# Patient Record
Sex: MALE | Race: WHITE | Employment: OTHER | URBAN - METROPOLITAN AREA
[De-identification: names, ages, dates, MRNs, and addresses within clinical notes are randomized per-mention and may not be internally consistent; named-entity substitution may affect disease eponyms.]

---

## 2017-05-18 ENCOUNTER — OFFICE VISIT (OUTPATIENT)
Dept: PODIATRY | Age: 82
End: 2017-05-18
Payer: MEDICARE

## 2017-05-18 VITALS
SYSTOLIC BLOOD PRESSURE: 107 MMHG | WEIGHT: 145 LBS | HEIGHT: 66 IN | DIASTOLIC BLOOD PRESSURE: 51 MMHG | BODY MASS INDEX: 23.3 KG/M2 | HEART RATE: 65 BPM

## 2017-05-18 DIAGNOSIS — B35.1 ONYCHOMYCOSIS OF TOENAIL: Primary | ICD-10-CM

## 2017-05-18 DIAGNOSIS — M79.674 PAIN OF TOES OF BOTH FEET: ICD-10-CM

## 2017-05-18 DIAGNOSIS — M79.675 PAIN OF TOES OF BOTH FEET: ICD-10-CM

## 2017-05-18 DIAGNOSIS — I73.9 PERIPHERAL VASCULAR DISEASE (HCC): ICD-10-CM

## 2017-05-18 PROCEDURE — G8427 DOCREV CUR MEDS BY ELIG CLIN: HCPCS | Performed by: PODIATRIST

## 2017-05-18 PROCEDURE — G8420 CALC BMI NORM PARAMETERS: HCPCS | Performed by: PODIATRIST

## 2017-05-18 PROCEDURE — 4040F PNEUMOC VAC/ADMIN/RCVD: CPT | Performed by: PODIATRIST

## 2017-05-18 PROCEDURE — 1036F TOBACCO NON-USER: CPT | Performed by: PODIATRIST

## 2017-05-18 PROCEDURE — 11721 DEBRIDE NAIL 6 OR MORE: CPT | Performed by: PODIATRIST

## 2017-05-18 PROCEDURE — 1123F ACP DISCUSS/DSCN MKR DOCD: CPT | Performed by: PODIATRIST

## 2017-05-18 RX ORDER — MIRTAZAPINE 30 MG/1
30 TABLET, FILM COATED ORAL
COMMUNITY
Start: 2017-05-15 | End: 2018-02-01

## 2017-05-18 RX ORDER — ATORVASTATIN CALCIUM 20 MG/1
TABLET, FILM COATED ORAL
COMMUNITY
Start: 2017-03-29

## 2017-05-18 RX ORDER — CLOPIDOGREL BISULFATE 75 MG/1
1 TABLET ORAL
COMMUNITY
Start: 2011-10-11

## 2017-05-18 ASSESSMENT — ENCOUNTER SYMPTOMS
BACK PAIN: 0
NAUSEA: 0
COLOR CHANGE: 0
SHORTNESS OF BREATH: 0
DIARRHEA: 0

## 2017-07-27 ENCOUNTER — PROCEDURE VISIT (OUTPATIENT)
Dept: PODIATRY | Age: 82
End: 2017-07-27
Payer: MEDICARE

## 2017-07-27 VITALS
SYSTOLIC BLOOD PRESSURE: 149 MMHG | BODY MASS INDEX: 23.3 KG/M2 | WEIGHT: 145 LBS | HEIGHT: 66 IN | DIASTOLIC BLOOD PRESSURE: 73 MMHG | HEART RATE: 66 BPM

## 2017-07-27 DIAGNOSIS — M79.674 PAIN OF TOES OF BOTH FEET: ICD-10-CM

## 2017-07-27 DIAGNOSIS — B35.1 ONYCHOMYCOSIS OF TOENAIL: Primary | ICD-10-CM

## 2017-07-27 DIAGNOSIS — M79.675 PAIN OF TOES OF BOTH FEET: ICD-10-CM

## 2017-07-27 DIAGNOSIS — I73.9 PERIPHERAL VASCULAR DISEASE (HCC): ICD-10-CM

## 2017-07-27 PROCEDURE — 11721 DEBRIDE NAIL 6 OR MORE: CPT | Performed by: PODIATRIST

## 2017-07-27 PROCEDURE — 1036F TOBACCO NON-USER: CPT | Performed by: PODIATRIST

## 2017-07-27 RX ORDER — ALBUTEROL SULFATE 90 UG/1
1-2 AEROSOL, METERED RESPIRATORY (INHALATION)
COMMUNITY
Start: 2017-05-19

## 2017-08-01 ASSESSMENT — ENCOUNTER SYMPTOMS
SHORTNESS OF BREATH: 0
DIARRHEA: 0
BACK PAIN: 0
COLOR CHANGE: 0
NAUSEA: 0

## 2017-08-18 ENCOUNTER — APPOINTMENT (OUTPATIENT)
Dept: CT IMAGING | Age: 82
End: 2017-08-18
Payer: MEDICARE

## 2017-08-18 ENCOUNTER — HOSPITAL ENCOUNTER (EMERGENCY)
Age: 82
Discharge: HOME | End: 2017-08-18
Attending: EMERGENCY MEDICINE
Payer: MEDICARE

## 2017-08-18 ENCOUNTER — APPOINTMENT (OUTPATIENT)
Dept: GENERAL RADIOLOGY | Age: 82
End: 2017-08-18
Payer: MEDICARE

## 2017-08-18 VITALS
SYSTOLIC BLOOD PRESSURE: 107 MMHG | OXYGEN SATURATION: 98 % | DIASTOLIC BLOOD PRESSURE: 87 MMHG | RESPIRATION RATE: 14 BRPM | WEIGHT: 144 LBS | BODY MASS INDEX: 23.6 KG/M2 | TEMPERATURE: 98.7 F | HEART RATE: 84 BPM

## 2017-08-18 DIAGNOSIS — S40.022A CONTUSION, SHOULDER AND UPPER ARM, MULTIPLE SITES, LEFT, INITIAL ENCOUNTER: ICD-10-CM
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
## 2017-08-18 DIAGNOSIS — S09.90XA CLOSED HEAD INJURY, INITIAL ENCOUNTER: ICD-10-CM

## 2017-08-18 DIAGNOSIS — S20.212A CONTUSION OF CHEST WALL, LEFT, INITIAL ENCOUNTER: ICD-10-CM

## 2017-08-18 DIAGNOSIS — J98.11 ATELECTASIS: ICD-10-CM

## 2017-08-18 DIAGNOSIS — S40.012A CONTUSION, SHOULDER AND UPPER ARM, MULTIPLE SITES, LEFT, INITIAL ENCOUNTER: ICD-10-CM

## 2017-08-18 LAB
ABSOLUTE EOS #: 0 K/UL (ref 0–0.4)
ABSOLUTE LYMPH #: 0.9 K/UL (ref 1–4.8)
ABSOLUTE MONO #: 0.6 K/UL (ref 0.1–1.3)
ANION GAP SERPL CALCULATED.3IONS-SCNC: 12 MMOL/L (ref 9–17)
BASOPHILS # BLD: 0 %
BASOPHILS ABSOLUTE: 0 K/UL (ref 0–0.2)
BUN BLDV-MCNC: 32 MG/DL (ref 8–23)
BUN/CREAT BLD: ABNORMAL (ref 9–20)
CALCIUM SERPL-MCNC: 9 MG/DL (ref 8.6–10.4)
CHLORIDE BLD-SCNC: 101 MMOL/L (ref 98–107)
CO2: 25 MMOL/L (ref 20–31)
CREAT SERPL-MCNC: 1.12 MG/DL (ref 0.7–1.2)
DIFFERENTIAL TYPE: ABNORMAL
EOSINOPHILS RELATIVE PERCENT: 1 %
GFR AFRICAN AMERICAN: >60 ML/MIN
GFR NON-AFRICAN AMERICAN: >60 ML/MIN
GFR SERPL CREATININE-BSD FRML MDRD: ABNORMAL ML/MIN/{1.73_M2}
GFR SERPL CREATININE-BSD FRML MDRD: ABNORMAL ML/MIN/{1.73_M2}
GLUCOSE BLD-MCNC: 149 MG/DL (ref 70–99)
HCT VFR BLD CALC: 29.9 % (ref 41–53)
HEMOGLOBIN: 9.5 G/DL (ref 13.5–17.5)
INR BLD: 1
LYMPHOCYTES # BLD: 18 %
MCH RBC QN AUTO: 26.3 PG (ref 26–34)
MCHC RBC AUTO-ENTMCNC: 31.9 G/DL (ref 31–37)
MCV RBC AUTO: 82.5 FL (ref 80–100)
MONOCYTES # BLD: 11 %
PDW BLD-RTO: 14.8 % (ref 11.5–14.9)
PLATELET # BLD: 155 K/UL (ref 150–450)
PLATELET ESTIMATE: ABNORMAL
PMV BLD AUTO: 7.9 FL (ref 6–12)
POTASSIUM SERPL-SCNC: 4.9 MMOL/L (ref 3.7–5.3)
PROTHROMBIN TIME: 10.6 SEC (ref 9.7–12)
RBC # BLD: 3.62 M/UL (ref 4.5–5.9)
RBC # BLD: ABNORMAL 10*6/UL
SEG NEUTROPHILS: 70 %
SEGMENTED NEUTROPHILS ABSOLUTE COUNT: 3.6 K/UL (ref 1.3–9.1)
SODIUM BLD-SCNC: 138 MMOL/L (ref 135–144)
TROPONIN INTERP: NORMAL
TROPONIN T: <0.03 NG/ML
WBC # BLD: 5.1 K/UL (ref 3.5–11)
WBC # BLD: ABNORMAL 10*3/UL

## 2017-08-18 PROCEDURE — 85610 PROTHROMBIN TIME: CPT

## 2017-08-18 PROCEDURE — 99284 EMERGENCY DEPT VISIT MOD MDM: CPT

## 2017-08-18 PROCEDURE — 70450 CT HEAD/BRAIN W/O DYE: CPT

## 2017-08-18 PROCEDURE — 80048 BASIC METABOLIC PNL TOTAL CA: CPT

## 2017-08-18 PROCEDURE — 71010 XR CHEST PORTABLE: CPT

## 2017-08-18 PROCEDURE — 36415 COLL VENOUS BLD VENIPUNCTURE: CPT

## 2017-08-18 PROCEDURE — 85025 COMPLETE CBC W/AUTO DIFF WBC: CPT

## 2017-08-18 PROCEDURE — 93005 ELECTROCARDIOGRAM TRACING: CPT

## 2017-08-18 PROCEDURE — 84484 ASSAY OF TROPONIN QUANT: CPT

## 2017-08-18 RX ORDER — ACETAMINOPHEN 325 MG/1
325 TABLET ORAL EVERY 4 HOURS PRN
Qty: 60 TABLET | Refills: 3 | Status: SHIPPED | OUTPATIENT
Start: 2017-08-18

## 2017-08-18 RX ORDER — LISINOPRIL 20 MG/1
20 TABLET ORAL DAILY
COMMUNITY
End: 2017-08-18

## 2017-08-18 ASSESSMENT — ENCOUNTER SYMPTOMS: SHORTNESS OF BREATH: 0

## 2017-08-18 ASSESSMENT — PAIN DESCRIPTION - PAIN TYPE: TYPE: ACUTE PAIN

## 2017-08-18 ASSESSMENT — PAIN SCALES - WONG BAKER: WONGBAKER_NUMERICALRESPONSE: 2

## 2017-08-18 ASSESSMENT — PAIN SCALES - GENERAL: PAINLEVEL_OUTOF10: 2

## 2017-08-18 ASSESSMENT — PAIN DESCRIPTION - LOCATION: LOCATION: HEAD

## 2017-08-23 LAB
EKG ATRIAL RATE: 75 BPM
EKG P AXIS: 33 DEGREES
EKG P-R INTERVAL: 146 MS
EKG Q-T INTERVAL: 374 MS
EKG QRS DURATION: 82 MS
EKG QTC CALCULATION (BAZETT): 417 MS
EKG R AXIS: -27 DEGREES
EKG T AXIS: 29 DEGREES
EKG VENTRICULAR RATE: 75 BPM

## 2017-10-09 ENCOUNTER — PROCEDURE VISIT (OUTPATIENT)
Dept: PODIATRY | Age: 82
End: 2017-10-09
Payer: MEDICARE

## 2017-10-09 VITALS
HEART RATE: 72 BPM | HEIGHT: 66 IN | WEIGHT: 145 LBS | DIASTOLIC BLOOD PRESSURE: 67 MMHG | SYSTOLIC BLOOD PRESSURE: 142 MMHG | BODY MASS INDEX: 23.3 KG/M2

## 2017-10-09 DIAGNOSIS — M79.674 PAIN OF TOES OF BOTH FEET: ICD-10-CM

## 2017-10-09 DIAGNOSIS — I73.9 PERIPHERAL VASCULAR DISEASE (HCC): ICD-10-CM

## 2017-10-09 DIAGNOSIS — M79.675 PAIN OF TOES OF BOTH FEET: ICD-10-CM

## 2017-10-09 DIAGNOSIS — B35.1 ONYCHOMYCOSIS OF TOENAIL: Primary | ICD-10-CM

## 2017-10-09 PROCEDURE — 11721 DEBRIDE NAIL 6 OR MORE: CPT | Performed by: PODIATRIST

## 2017-10-09 PROCEDURE — 1036F TOBACCO NON-USER: CPT | Performed by: PODIATRIST

## 2017-10-11 ASSESSMENT — ENCOUNTER SYMPTOMS
SHORTNESS OF BREATH: 0
NAUSEA: 0
COLOR CHANGE: 0
BACK PAIN: 0
DIARRHEA: 0

## 2017-10-11 NOTE — PROGRESS NOTES
SUBJECTIVE: Lina Palacio is a 80 y.o. male who returns to the office with chief complaint of painful fungal toenails. Patient relates toe nails are thickened/difficult to trim as well as painful with ambulation and with shoe gear. Review of Systems   Constitutional: Negative for activity change, appetite change, chills, diaphoresis, fatigue and fever. Respiratory: Negative for shortness of breath. Cardiovascular: Negative for leg swelling. Gastrointestinal: Negative for diarrhea and nausea. Endocrine: Negative for cold intolerance, heat intolerance and polyuria. Musculoskeletal: Positive for arthralgias. Negative for back pain, gait problem, joint swelling and myalgias. Skin: Negative for color change, pallor, rash and wound. Allergic/Immunologic: Negative for environmental allergies and food allergies. Neurological: Negative for dizziness, weakness, light-headedness and numbness. Hematological: Does not bruise/bleed easily. Psychiatric/Behavioral: Negative for behavioral problems, confusion and self-injury. The patient is not nervous/anxious. OBJECTIVE: Clinical evaluation of patient reveals nails 1,2,3,4,5 of the right foot and nails 1,2,3,4,5, of the left foot to present with thickness, elongation, discoloration, brittleness, and subungual debris. There was pain with palpation and debridement of the toenails of the bilateral feet. No open lesions noted to either foot today. The right DP pulse is not palpable. The left DP pulse is not palpable. The right PT pulse is palpable. The left PT pulse is palpable. ASSESSMENT:   1. Onychomycosis of toenail  TN DEBRIDEMENT OF NAILS, 6 OR MORE   2. Pain of toes of both feet  TN DEBRIDEMENT OF NAILS, 6 OR MORE   3. Peripheral vascular disease (HCC)  TN DEBRIDEMENT OF NAILS, 6 OR MORE     PLAN: Toenails 1,2,3,4,5 of the right foot and 1,2,3,4,5 of the left foot were debrided in length and thickness using a nail nipper and a .

## 2017-10-26 ENCOUNTER — HOSPITAL ENCOUNTER (OUTPATIENT)
Age: 82
Discharge: HOME | End: 2017-10-26
Payer: MEDICARE

## 2017-10-26 LAB
ALT SERPL-CCNC: 11 U/L (ref 5–41)
ANION GAP SERPL CALCULATED.3IONS-SCNC: 11 MMOL/L (ref 9–17)
AST SERPL-CCNC: 17 U/L
BUN BLDV-MCNC: 23 MG/DL (ref 8–23)
BUN/CREAT BLD: ABNORMAL (ref 9–20)
CALCIUM SERPL-MCNC: 9.3 MG/DL (ref 8.6–10.4)
CHLORIDE BLD-SCNC: 102 MMOL/L (ref 98–107)
CHOLESTEROL/HDL RATIO: 2.6
CHOLESTEROL: 110 MG/DL
CO2: 29 MMOL/L (ref 20–31)
CREAT SERPL-MCNC: 0.98 MG/DL (ref 0.7–1.2)
GFR AFRICAN AMERICAN: >60 ML/MIN
GFR NON-AFRICAN AMERICAN: >60 ML/MIN
GFR SERPL CREATININE-BSD FRML MDRD: ABNORMAL ML/MIN/{1.73_M2}
GFR SERPL CREATININE-BSD FRML MDRD: ABNORMAL ML/MIN/{1.73_M2}
GLUCOSE BLD-MCNC: 112 MG/DL (ref 70–99)
HCT VFR BLD CALC: 31.2 % (ref 41–53)
HDLC SERPL-MCNC: 43 MG/DL
HEMOGLOBIN: 9.7 G/DL (ref 13.5–17.5)
LDL CHOLESTEROL: 47 MG/DL (ref 0–130)
MCH RBC QN AUTO: 24.7 PG (ref 26–34)
MCHC RBC AUTO-ENTMCNC: 31 G/DL (ref 31–37)
MCV RBC AUTO: 79.7 FL (ref 80–100)
PDW BLD-RTO: 15.7 % (ref 11.5–14.9)
PLATELET # BLD: 152 K/UL (ref 150–450)
PMV BLD AUTO: 8.5 FL (ref 6–12)
POTASSIUM SERPL-SCNC: 4.3 MMOL/L (ref 3.7–5.3)
RBC # BLD: 3.91 M/UL (ref 4.5–5.9)
SODIUM BLD-SCNC: 142 MMOL/L (ref 135–144)
TRIGL SERPL-MCNC: 98 MG/DL
VLDLC SERPL CALC-MCNC: NORMAL MG/DL (ref 1–30)
WBC # BLD: 5 K/UL (ref 3.5–11)

## 2017-10-26 PROCEDURE — 80048 BASIC METABOLIC PNL TOTAL CA: CPT

## 2017-10-26 PROCEDURE — 84450 TRANSFERASE (AST) (SGOT): CPT

## 2017-10-26 PROCEDURE — 36415 COLL VENOUS BLD VENIPUNCTURE: CPT

## 2017-10-26 PROCEDURE — 85027 COMPLETE CBC AUTOMATED: CPT

## 2017-10-26 PROCEDURE — 84460 ALANINE AMINO (ALT) (SGPT): CPT

## 2017-10-26 PROCEDURE — 80061 LIPID PANEL: CPT

## 2017-12-14 ENCOUNTER — TELEPHONE (OUTPATIENT)
Dept: GASTROENTEROLOGY | Age: 82
End: 2017-12-14

## 2018-01-04 ENCOUNTER — TELEPHONE (OUTPATIENT)
Dept: GASTROENTEROLOGY | Age: 83
End: 2018-01-04

## 2018-01-05 ENCOUNTER — OFFICE VISIT (OUTPATIENT)
Dept: PODIATRY | Age: 83
End: 2018-01-05
Payer: MEDICARE

## 2018-01-05 VITALS
BODY MASS INDEX: 23.3 KG/M2 | SYSTOLIC BLOOD PRESSURE: 164 MMHG | DIASTOLIC BLOOD PRESSURE: 82 MMHG | HEIGHT: 66 IN | HEART RATE: 54 BPM | WEIGHT: 145 LBS

## 2018-01-05 DIAGNOSIS — M79.675 PAIN OF TOES OF BOTH FEET: ICD-10-CM

## 2018-01-05 DIAGNOSIS — B35.1 ONYCHOMYCOSIS OF TOENAIL: Primary | ICD-10-CM

## 2018-01-05 DIAGNOSIS — I73.9 PERIPHERAL VASCULAR DISEASE (HCC): ICD-10-CM

## 2018-01-05 DIAGNOSIS — M79.674 PAIN OF TOES OF BOTH FEET: ICD-10-CM

## 2018-01-05 PROCEDURE — 11721 DEBRIDE NAIL 6 OR MORE: CPT | Performed by: PODIATRIST

## 2018-01-08 ENCOUNTER — HOSPITAL ENCOUNTER (OUTPATIENT)
Dept: SPEECH THERAPY | Age: 83
Setting detail: THERAPIES SERIES
Discharge: HOME | End: 2018-01-08
Payer: MEDICARE

## 2018-01-08 ASSESSMENT — ENCOUNTER SYMPTOMS
COLOR CHANGE: 0
SHORTNESS OF BREATH: 0
DIARRHEA: 0
NAUSEA: 0
BACK PAIN: 0

## 2018-01-19 ENCOUNTER — HOSPITAL ENCOUNTER (OUTPATIENT)
Age: 83
Discharge: HOME | End: 2018-01-19
Payer: MEDICARE

## 2018-01-19 LAB
FERRITIN: 26 UG/L (ref 30–400)
HCT VFR BLD CALC: 36.7 % (ref 41–53)
HEMOGLOBIN: 11.5 G/DL (ref 13.5–17.5)
IRON SATURATION: 10 % (ref 20–55)
IRON: 36 UG/DL (ref 59–158)
MCH RBC QN AUTO: 28 PG (ref 26–34)
MCHC RBC AUTO-ENTMCNC: 31.4 G/DL (ref 31–37)
MCV RBC AUTO: 89 FL (ref 80–100)
NRBC AUTOMATED: ABNORMAL PER 100 WBC
PDW BLD-RTO: 20.6 % (ref 11.5–14.9)
PLATELET # BLD: 173 K/UL (ref 150–450)
PMV BLD AUTO: 8.1 FL (ref 6–12)
RBC # BLD: 4.12 M/UL (ref 4.5–5.9)
TOTAL IRON BINDING CAPACITY: 343 UG/DL (ref 250–450)
UNSATURATED IRON BINDING CAPACITY: 307 UG/DL (ref 112–347)
WBC # BLD: 6.7 K/UL (ref 3.5–11)

## 2018-01-19 PROCEDURE — 36415 COLL VENOUS BLD VENIPUNCTURE: CPT

## 2018-01-19 PROCEDURE — 83540 ASSAY OF IRON: CPT

## 2018-01-19 PROCEDURE — 82728 ASSAY OF FERRITIN: CPT

## 2018-01-19 PROCEDURE — 83550 IRON BINDING TEST: CPT

## 2018-01-19 PROCEDURE — 85027 COMPLETE CBC AUTOMATED: CPT

## 2018-02-01 ENCOUNTER — OFFICE VISIT (OUTPATIENT)
Dept: GASTROENTEROLOGY | Age: 83
End: 2018-02-01
Payer: MEDICARE

## 2018-02-01 ENCOUNTER — HOSPITAL ENCOUNTER (OUTPATIENT)
Age: 83
Discharge: HOME | End: 2018-02-03
Payer: MEDICARE

## 2018-02-01 ENCOUNTER — HOSPITAL ENCOUNTER (OUTPATIENT)
Age: 83
Discharge: HOME | End: 2018-02-01
Payer: MEDICARE

## 2018-02-01 ENCOUNTER — HOSPITAL ENCOUNTER (OUTPATIENT)
Dept: GENERAL RADIOLOGY | Age: 83
Discharge: HOME | End: 2018-02-03
Payer: MEDICARE

## 2018-02-01 VITALS
HEIGHT: 62 IN | WEIGHT: 135 LBS | SYSTOLIC BLOOD PRESSURE: 137 MMHG | OXYGEN SATURATION: 98 % | HEART RATE: 88 BPM | BODY MASS INDEX: 24.84 KG/M2 | RESPIRATION RATE: 14 BRPM | DIASTOLIC BLOOD PRESSURE: 64 MMHG

## 2018-02-01 DIAGNOSIS — R13.10 DYSPHAGIA, UNSPECIFIED TYPE: Primary | ICD-10-CM

## 2018-02-01 DIAGNOSIS — D50.9 IRON DEFICIENCY ANEMIA, UNSPECIFIED IRON DEFICIENCY ANEMIA TYPE: ICD-10-CM

## 2018-02-01 DIAGNOSIS — I50.9 CONGESTIVE HEART FAILURE, UNSPECIFIED CONGESTIVE HEART FAILURE CHRONICITY, UNSPECIFIED CONGESTIVE HEART FAILURE TYPE: ICD-10-CM

## 2018-02-01 DIAGNOSIS — R06.02 SOB (SHORTNESS OF BREATH): ICD-10-CM

## 2018-02-01 LAB
ABSOLUTE EOS #: 0.06 K/UL (ref 0–0.4)
ABSOLUTE IMMATURE GRANULOCYTE: ABNORMAL K/UL (ref 0–0.3)
ABSOLUTE LYMPH #: 1.34 K/UL (ref 1–4.8)
ABSOLUTE MONO #: 0.5 K/UL (ref 0.1–1.3)
BASOPHILS # BLD: 0 % (ref 0–2)
BASOPHILS ABSOLUTE: 0 K/UL (ref 0–0.2)
BNP INTERPRETATION: NORMAL
DIFFERENTIAL TYPE: ABNORMAL
EOSINOPHILS RELATIVE PERCENT: 1 % (ref 0–4)
HCT VFR BLD CALC: 33.6 % (ref 41–53)
HEMOGLOBIN: 10.9 G/DL (ref 13.5–17.5)
IMMATURE GRANULOCYTES: ABNORMAL %
LYMPHOCYTES # BLD: 24 % (ref 24–44)
MCH RBC QN AUTO: 28.8 PG (ref 26–34)
MCHC RBC AUTO-ENTMCNC: 32.3 G/DL (ref 31–37)
MCV RBC AUTO: 89.1 FL (ref 80–100)
MONOCYTES # BLD: 9 % (ref 1–7)
MORPHOLOGY: ABNORMAL
NRBC AUTOMATED: ABNORMAL PER 100 WBC
PDW BLD-RTO: 20 % (ref 11.5–14.9)
PLATELET # BLD: 171 K/UL (ref 150–450)
PLATELET ESTIMATE: ABNORMAL
PMV BLD AUTO: 7.9 FL (ref 6–12)
PRO-BNP: 190 PG/ML
RBC # BLD: 3.77 M/UL (ref 4.5–5.9)
RBC # BLD: ABNORMAL 10*6/UL
SEG NEUTROPHILS: 66 % (ref 36–66)
SEGMENTED NEUTROPHILS ABSOLUTE COUNT: 3.7 K/UL (ref 1.3–9.1)
WBC # BLD: 5.6 K/UL (ref 3.5–11)
WBC # BLD: ABNORMAL 10*3/UL

## 2018-02-01 PROCEDURE — 83880 ASSAY OF NATRIURETIC PEPTIDE: CPT

## 2018-02-01 PROCEDURE — G8420 CALC BMI NORM PARAMETERS: HCPCS | Performed by: INTERNAL MEDICINE

## 2018-02-01 PROCEDURE — 85025 COMPLETE CBC W/AUTO DIFF WBC: CPT

## 2018-02-01 PROCEDURE — 36415 COLL VENOUS BLD VENIPUNCTURE: CPT

## 2018-02-01 PROCEDURE — 1036F TOBACCO NON-USER: CPT | Performed by: INTERNAL MEDICINE

## 2018-02-01 PROCEDURE — 71046 X-RAY EXAM CHEST 2 VIEWS: CPT

## 2018-02-01 PROCEDURE — 4040F PNEUMOC VAC/ADMIN/RCVD: CPT | Performed by: INTERNAL MEDICINE

## 2018-02-01 PROCEDURE — G8427 DOCREV CUR MEDS BY ELIG CLIN: HCPCS | Performed by: INTERNAL MEDICINE

## 2018-02-01 PROCEDURE — G8484 FLU IMMUNIZE NO ADMIN: HCPCS | Performed by: INTERNAL MEDICINE

## 2018-02-01 PROCEDURE — 99214 OFFICE O/P EST MOD 30 MIN: CPT | Performed by: INTERNAL MEDICINE

## 2018-02-01 PROCEDURE — 1123F ACP DISCUSS/DSCN MKR DOCD: CPT | Performed by: INTERNAL MEDICINE

## 2018-02-01 RX ORDER — M-VIT,TX,IRON,MINS/CALC/FOLIC 27MG-0.4MG
1 TABLET ORAL DAILY
COMMUNITY

## 2018-02-01 ASSESSMENT — ENCOUNTER SYMPTOMS
COUGH: 1
ALLERGIC/IMMUNOLOGIC NEGATIVE: 1
BACK PAIN: 0
TROUBLE SWALLOWING: 0
DIARRHEA: 0
NAUSEA: 0
VOMITING: 0
WHEEZING: 1
CONSTIPATION: 1
ANAL BLEEDING: 0
SHORTNESS OF BREATH: 1
ABDOMINAL PAIN: 1
RECTAL PAIN: 0
ABDOMINAL DISTENTION: 0
BLOOD IN STOOL: 0

## 2018-02-12 DIAGNOSIS — R06.02 SHORTNESS OF BREATH: Primary | ICD-10-CM

## 2018-02-13 DIAGNOSIS — R06.02 SHORTNESS OF BREATH: Primary | ICD-10-CM

## 2018-02-13 DIAGNOSIS — D50.9 IRON DEFICIENCY ANEMIA, UNSPECIFIED IRON DEFICIENCY ANEMIA TYPE: ICD-10-CM

## 2018-02-14 RX ORDER — OMEPRAZOLE 20 MG/1
20 CAPSULE, DELAYED RELEASE ORAL DAILY
Qty: 30 CAPSULE | Refills: 1 | Status: SHIPPED | OUTPATIENT
Start: 2018-02-14 | End: 2018-06-28

## 2018-03-13 DIAGNOSIS — D50.9 IRON DEFICIENCY ANEMIA, UNSPECIFIED IRON DEFICIENCY ANEMIA TYPE: Primary | ICD-10-CM

## 2018-03-23 ENCOUNTER — PROCEDURE VISIT (OUTPATIENT)
Dept: PODIATRY | Age: 83
End: 2018-03-23
Payer: MEDICARE

## 2018-03-23 VITALS
DIASTOLIC BLOOD PRESSURE: 60 MMHG | SYSTOLIC BLOOD PRESSURE: 126 MMHG | WEIGHT: 138 LBS | HEART RATE: 56 BPM | BODY MASS INDEX: 22.99 KG/M2 | HEIGHT: 65 IN

## 2018-03-23 DIAGNOSIS — M79.674 PAIN OF TOES OF BOTH FEET: ICD-10-CM

## 2018-03-23 DIAGNOSIS — I73.9 PERIPHERAL VASCULAR DISEASE (HCC): ICD-10-CM

## 2018-03-23 DIAGNOSIS — M79.675 PAIN OF TOES OF BOTH FEET: ICD-10-CM

## 2018-03-23 DIAGNOSIS — B35.1 ONYCHOMYCOSIS OF TOENAIL: Primary | ICD-10-CM

## 2018-03-23 PROCEDURE — 11721 DEBRIDE NAIL 6 OR MORE: CPT | Performed by: PODIATRIST

## 2018-03-24 ENCOUNTER — HOSPITAL ENCOUNTER (OUTPATIENT)
Age: 83
Discharge: HOME | End: 2018-03-24
Payer: MEDICARE

## 2018-03-24 DIAGNOSIS — D50.9 IRON DEFICIENCY ANEMIA, UNSPECIFIED IRON DEFICIENCY ANEMIA TYPE: ICD-10-CM

## 2018-03-24 LAB
ABSOLUTE EOS #: 0 K/UL (ref 0–0.4)
ABSOLUTE IMMATURE GRANULOCYTE: ABNORMAL K/UL (ref 0–0.3)
ABSOLUTE LYMPH #: 1.4 K/UL (ref 1–4.8)
ABSOLUTE MONO #: 0.3 K/UL (ref 0.1–1.3)
BASOPHILS # BLD: 1 % (ref 0–2)
BASOPHILS ABSOLUTE: 0 K/UL (ref 0–0.2)
DIFFERENTIAL TYPE: ABNORMAL
EOSINOPHILS RELATIVE PERCENT: 1 % (ref 0–4)
FERRITIN: 69 UG/L (ref 30–400)
HCT VFR BLD CALC: 33.4 % (ref 41–53)
HEMOGLOBIN: 11 G/DL (ref 13.5–17.5)
IMMATURE GRANULOCYTES: ABNORMAL %
IRON SATURATION: 8 % (ref 20–55)
IRON: 21 UG/DL (ref 59–158)
LYMPHOCYTES # BLD: 26 % (ref 24–44)
MCH RBC QN AUTO: 30 PG (ref 26–34)
MCHC RBC AUTO-ENTMCNC: 33 G/DL (ref 31–37)
MCV RBC AUTO: 90.9 FL (ref 80–100)
MONOCYTES # BLD: 6 % (ref 1–7)
NRBC AUTOMATED: ABNORMAL PER 100 WBC
PDW BLD-RTO: 15.8 % (ref 11.5–14.9)
PLATELET # BLD: 172 K/UL (ref 150–450)
PLATELET ESTIMATE: ABNORMAL
PMV BLD AUTO: 7.8 FL (ref 6–12)
RBC # BLD: 3.67 M/UL (ref 4.5–5.9)
RBC # BLD: ABNORMAL 10*6/UL
SEG NEUTROPHILS: 66 % (ref 36–66)
SEGMENTED NEUTROPHILS ABSOLUTE COUNT: 3.4 K/UL (ref 1.3–9.1)
TOTAL IRON BINDING CAPACITY: 259 UG/DL (ref 250–450)
UNSATURATED IRON BINDING CAPACITY: 238 UG/DL (ref 112–347)
WBC # BLD: 5.2 K/UL (ref 3.5–11)
WBC # BLD: ABNORMAL 10*3/UL

## 2018-03-24 PROCEDURE — 36415 COLL VENOUS BLD VENIPUNCTURE: CPT

## 2018-03-24 PROCEDURE — 85025 COMPLETE CBC W/AUTO DIFF WBC: CPT

## 2018-03-24 PROCEDURE — 83540 ASSAY OF IRON: CPT

## 2018-03-24 PROCEDURE — 83550 IRON BINDING TEST: CPT

## 2018-03-24 PROCEDURE — 82728 ASSAY OF FERRITIN: CPT

## 2018-03-28 DIAGNOSIS — D50.9 IRON DEFICIENCY ANEMIA, UNSPECIFIED IRON DEFICIENCY ANEMIA TYPE: Primary | ICD-10-CM

## 2018-03-29 ASSESSMENT — ENCOUNTER SYMPTOMS
NAUSEA: 0
BACK PAIN: 0
SHORTNESS OF BREATH: 0
DIARRHEA: 0
COLOR CHANGE: 0

## 2018-04-19 ENCOUNTER — TELEPHONE (OUTPATIENT)
Dept: GASTROENTEROLOGY | Age: 83
End: 2018-04-19

## 2018-04-19 DIAGNOSIS — D50.9 IRON DEFICIENCY ANEMIA, UNSPECIFIED IRON DEFICIENCY ANEMIA TYPE: ICD-10-CM

## 2018-04-25 ENCOUNTER — TELEPHONE (OUTPATIENT)
Dept: GASTROENTEROLOGY | Age: 83
End: 2018-04-25

## 2018-05-14 ENCOUNTER — OFFICE VISIT (OUTPATIENT)
Dept: GASTROENTEROLOGY | Age: 83
End: 2018-05-14
Payer: MEDICARE

## 2018-05-14 ENCOUNTER — TELEPHONE (OUTPATIENT)
Dept: GASTROENTEROLOGY | Age: 83
End: 2018-05-14

## 2018-05-14 VITALS
HEART RATE: 72 BPM | DIASTOLIC BLOOD PRESSURE: 85 MMHG | HEIGHT: 64 IN | RESPIRATION RATE: 14 BRPM | SYSTOLIC BLOOD PRESSURE: 131 MMHG | OXYGEN SATURATION: 99 % | BODY MASS INDEX: 21.68 KG/M2 | WEIGHT: 127 LBS

## 2018-05-14 DIAGNOSIS — R13.10 DYSPHAGIA, UNSPECIFIED TYPE: ICD-10-CM

## 2018-05-14 DIAGNOSIS — R63.4 WEIGHT LOSS: ICD-10-CM

## 2018-05-14 DIAGNOSIS — D50.9 IRON DEFICIENCY ANEMIA, UNSPECIFIED IRON DEFICIENCY ANEMIA TYPE: Primary | ICD-10-CM

## 2018-05-14 PROCEDURE — 4040F PNEUMOC VAC/ADMIN/RCVD: CPT | Performed by: INTERNAL MEDICINE

## 2018-05-14 PROCEDURE — 1123F ACP DISCUSS/DSCN MKR DOCD: CPT | Performed by: INTERNAL MEDICINE

## 2018-05-14 PROCEDURE — G8420 CALC BMI NORM PARAMETERS: HCPCS | Performed by: INTERNAL MEDICINE

## 2018-05-14 PROCEDURE — 99214 OFFICE O/P EST MOD 30 MIN: CPT | Performed by: INTERNAL MEDICINE

## 2018-05-14 PROCEDURE — G8427 DOCREV CUR MEDS BY ELIG CLIN: HCPCS | Performed by: INTERNAL MEDICINE

## 2018-05-14 PROCEDURE — 1036F TOBACCO NON-USER: CPT | Performed by: INTERNAL MEDICINE

## 2018-05-14 RX ORDER — MEGESTROL ACETATE 20 MG/1
20 TABLET ORAL DAILY
Qty: 30 TABLET | Refills: 3 | Status: SHIPPED | OUTPATIENT
Start: 2018-05-14

## 2018-05-14 ASSESSMENT — ENCOUNTER SYMPTOMS
ABDOMINAL DISTENTION: 0
COUGH: 0
ALLERGIC/IMMUNOLOGIC NEGATIVE: 1
ANAL BLEEDING: 0
BACK PAIN: 0
WHEEZING: 0
RESPIRATORY NEGATIVE: 1
TROUBLE SWALLOWING: 0
VOMITING: 0
CONSTIPATION: 0
BLOOD IN STOOL: 0
RECTAL PAIN: 0
SHORTNESS OF BREATH: 0
GASTROINTESTINAL NEGATIVE: 1
DIARRHEA: 0
ABDOMINAL PAIN: 0
NAUSEA: 0

## 2018-05-18 ENCOUNTER — TELEPHONE (OUTPATIENT)
Dept: GASTROENTEROLOGY | Age: 83
End: 2018-05-18

## 2018-06-01 LAB
IRON: 53
TOTAL IRON BINDING CAPACITY: 331

## 2018-06-05 DIAGNOSIS — D50.9 IRON DEFICIENCY ANEMIA, UNSPECIFIED IRON DEFICIENCY ANEMIA TYPE: ICD-10-CM

## 2018-06-14 ENCOUNTER — HOSPITAL ENCOUNTER (OUTPATIENT)
Dept: CT IMAGING | Age: 83
Discharge: HOME | End: 2018-06-16
Payer: MEDICARE

## 2018-06-14 DIAGNOSIS — R63.4 WEIGHT LOSS: ICD-10-CM

## 2018-06-14 LAB
BUN BLDV-MCNC: 19 MG/DL (ref 8–23)
CREAT SERPL-MCNC: 0.93 MG/DL (ref 0.7–1.2)
GFR AFRICAN AMERICAN: >60 ML/MIN
GFR NON-AFRICAN AMERICAN: >60 ML/MIN
GFR SERPL CREATININE-BSD FRML MDRD: NORMAL ML/MIN/{1.73_M2}
GFR SERPL CREATININE-BSD FRML MDRD: NORMAL ML/MIN/{1.73_M2}

## 2018-06-14 PROCEDURE — 74177 CT ABD & PELVIS W/CONTRAST: CPT

## 2018-06-14 PROCEDURE — 6360000004 HC RX CONTRAST MEDICATION: Performed by: INTERNAL MEDICINE

## 2018-06-14 PROCEDURE — 36415 COLL VENOUS BLD VENIPUNCTURE: CPT

## 2018-06-14 PROCEDURE — 2580000003 HC RX 258: Performed by: INTERNAL MEDICINE

## 2018-06-14 PROCEDURE — 84520 ASSAY OF UREA NITROGEN: CPT

## 2018-06-14 PROCEDURE — 82565 ASSAY OF CREATININE: CPT

## 2018-06-14 RX ORDER — SODIUM CHLORIDE 0.9 % (FLUSH) 0.9 %
10 SYRINGE (ML) INJECTION PRN
Status: DISCONTINUED | OUTPATIENT
Start: 2018-06-14 | End: 2018-06-17 | Stop reason: HOSPADM

## 2018-06-14 RX ORDER — 0.9 % SODIUM CHLORIDE 0.9 %
80 INTRAVENOUS SOLUTION INTRAVENOUS ONCE
Status: COMPLETED | OUTPATIENT
Start: 2018-06-14 | End: 2018-06-14

## 2018-06-14 RX ADMIN — Medication 10 ML: at 09:55

## 2018-06-14 RX ADMIN — IOPAMIDOL 75 ML: 755 INJECTION, SOLUTION INTRAVENOUS at 09:56

## 2018-06-14 RX ADMIN — IOHEXOL 50 ML: 240 INJECTION, SOLUTION INTRATHECAL; INTRAVASCULAR; INTRAVENOUS; ORAL at 09:55

## 2018-06-14 RX ADMIN — SODIUM CHLORIDE 80 ML: 9 INJECTION, SOLUTION INTRAVENOUS at 09:55

## 2018-06-28 ENCOUNTER — OFFICE VISIT (OUTPATIENT)
Dept: GASTROENTEROLOGY | Age: 83
End: 2018-06-28
Payer: MEDICARE

## 2018-06-28 VITALS
RESPIRATION RATE: 14 BRPM | OXYGEN SATURATION: 96 % | BODY MASS INDEX: 22.2 KG/M2 | SYSTOLIC BLOOD PRESSURE: 114 MMHG | WEIGHT: 130 LBS | HEART RATE: 60 BPM | HEIGHT: 64 IN | DIASTOLIC BLOOD PRESSURE: 63 MMHG

## 2018-06-28 DIAGNOSIS — D50.9 IRON DEFICIENCY ANEMIA, UNSPECIFIED IRON DEFICIENCY ANEMIA TYPE: Primary | ICD-10-CM

## 2018-06-28 DIAGNOSIS — R13.10 DYSPHAGIA, UNSPECIFIED TYPE: ICD-10-CM

## 2018-06-28 DIAGNOSIS — R93.5 ABNORMAL CT OF THE ABDOMEN: ICD-10-CM

## 2018-06-28 DIAGNOSIS — R63.4 WEIGHT LOSS: ICD-10-CM

## 2018-06-28 PROCEDURE — 1036F TOBACCO NON-USER: CPT | Performed by: INTERNAL MEDICINE

## 2018-06-28 PROCEDURE — 99214 OFFICE O/P EST MOD 30 MIN: CPT | Performed by: INTERNAL MEDICINE

## 2018-06-28 PROCEDURE — 1123F ACP DISCUSS/DSCN MKR DOCD: CPT | Performed by: INTERNAL MEDICINE

## 2018-06-28 PROCEDURE — 4040F PNEUMOC VAC/ADMIN/RCVD: CPT | Performed by: INTERNAL MEDICINE

## 2018-06-28 PROCEDURE — G8427 DOCREV CUR MEDS BY ELIG CLIN: HCPCS | Performed by: INTERNAL MEDICINE

## 2018-06-28 PROCEDURE — G8420 CALC BMI NORM PARAMETERS: HCPCS | Performed by: INTERNAL MEDICINE

## 2018-06-28 ASSESSMENT — ENCOUNTER SYMPTOMS
TROUBLE SWALLOWING: 0
RECTAL PAIN: 0
SHORTNESS OF BREATH: 0
BLOOD IN STOOL: 0
CONSTIPATION: 0
DIARRHEA: 0
COUGH: 0
ABDOMINAL DISTENTION: 0
VOMITING: 0
BACK PAIN: 0
ANAL BLEEDING: 0
RESPIRATORY NEGATIVE: 1
ABDOMINAL PAIN: 0
WHEEZING: 0
NAUSEA: 0
GASTROINTESTINAL NEGATIVE: 1
ALLERGIC/IMMUNOLOGIC NEGATIVE: 1

## 2018-06-29 ENCOUNTER — HOSPITAL ENCOUNTER (OUTPATIENT)
Age: 83
Discharge: HOME | End: 2018-06-29
Payer: MEDICARE

## 2018-06-29 DIAGNOSIS — R63.4 WEIGHT LOSS: ICD-10-CM

## 2018-06-29 DIAGNOSIS — D50.9 IRON DEFICIENCY ANEMIA, UNSPECIFIED IRON DEFICIENCY ANEMIA TYPE: ICD-10-CM

## 2018-06-29 LAB
ABSOLUTE EOS #: 0 K/UL (ref 0–0.4)
ABSOLUTE IMMATURE GRANULOCYTE: ABNORMAL K/UL (ref 0–0.3)
ABSOLUTE LYMPH #: 1.5 K/UL (ref 1–4.8)
ABSOLUTE MONO #: 0.4 K/UL (ref 0.1–1.3)
ALBUMIN SERPL-MCNC: 3.7 G/DL (ref 3.5–5.2)
ALBUMIN/GLOBULIN RATIO: ABNORMAL (ref 1–2.5)
ALP BLD-CCNC: 102 U/L (ref 40–129)
ALT SERPL-CCNC: <5 U/L (ref 5–41)
ANION GAP SERPL CALCULATED.3IONS-SCNC: 7 MMOL/L (ref 9–17)
AST SERPL-CCNC: 16 U/L
BASOPHILS # BLD: 1 % (ref 0–2)
BASOPHILS ABSOLUTE: 0.1 K/UL (ref 0–0.2)
BILIRUB SERPL-MCNC: 0.39 MG/DL (ref 0.3–1.2)
BILIRUBIN DIRECT: 0.1 MG/DL
BILIRUBIN, INDIRECT: 0.29 MG/DL (ref 0–1)
BUN BLDV-MCNC: 25 MG/DL (ref 8–23)
CALCIUM SERPL-MCNC: 9.1 MG/DL (ref 8.6–10.4)
CHLORIDE BLD-SCNC: 103 MMOL/L (ref 98–107)
CO2: 29 MMOL/L (ref 20–31)
CREAT SERPL-MCNC: 0.9 MG/DL (ref 0.7–1.2)
DIFFERENTIAL TYPE: ABNORMAL
EOSINOPHILS RELATIVE PERCENT: 1 % (ref 0–4)
GFR AFRICAN AMERICAN: >60 ML/MIN
GFR NON-AFRICAN AMERICAN: >60 ML/MIN
GFR SERPL CREATININE-BSD FRML MDRD: ABNORMAL ML/MIN/{1.73_M2}
GFR SERPL CREATININE-BSD FRML MDRD: ABNORMAL ML/MIN/{1.73_M2}
GLUCOSE BLD-MCNC: 107 MG/DL (ref 70–99)
HCT VFR BLD CALC: 36.3 % (ref 41–53)
HEMOGLOBIN: 12.2 G/DL (ref 13.5–17.5)
IMMATURE GRANULOCYTES: ABNORMAL %
IRON SATURATION: 30 % (ref 20–55)
IRON: 80 UG/DL (ref 59–158)
LYMPHOCYTES # BLD: 30 % (ref 24–44)
MCH RBC QN AUTO: 32.6 PG (ref 26–34)
MCHC RBC AUTO-ENTMCNC: 33.6 G/DL (ref 31–37)
MCV RBC AUTO: 97 FL (ref 80–100)
MONOCYTES # BLD: 7 % (ref 1–7)
NRBC AUTOMATED: ABNORMAL PER 100 WBC
PDW BLD-RTO: 15 % (ref 11.5–14.9)
PLATELET # BLD: 140 K/UL (ref 150–450)
PLATELET ESTIMATE: ABNORMAL
PMV BLD AUTO: 8.2 FL (ref 6–12)
POTASSIUM SERPL-SCNC: 5.2 MMOL/L (ref 3.7–5.3)
RBC # BLD: 3.74 M/UL (ref 4.5–5.9)
RBC # BLD: ABNORMAL 10*6/UL
SEG NEUTROPHILS: 61 % (ref 36–66)
SEGMENTED NEUTROPHILS ABSOLUTE COUNT: 3.2 K/UL (ref 1.3–9.1)
SODIUM BLD-SCNC: 139 MMOL/L (ref 135–144)
TOTAL IRON BINDING CAPACITY: 263 UG/DL (ref 250–450)
TOTAL PROTEIN: 6.2 G/DL (ref 6.4–8.3)
UNSATURATED IRON BINDING CAPACITY: 183 UG/DL (ref 112–347)
WBC # BLD: 5.2 K/UL (ref 3.5–11)
WBC # BLD: ABNORMAL 10*3/UL

## 2018-06-29 PROCEDURE — 82248 BILIRUBIN DIRECT: CPT

## 2018-06-29 PROCEDURE — 80053 COMPREHEN METABOLIC PANEL: CPT

## 2018-06-29 PROCEDURE — 85025 COMPLETE CBC W/AUTO DIFF WBC: CPT

## 2018-06-29 PROCEDURE — 83550 IRON BINDING TEST: CPT

## 2018-06-29 PROCEDURE — 83540 ASSAY OF IRON: CPT

## 2018-06-29 PROCEDURE — 36415 COLL VENOUS BLD VENIPUNCTURE: CPT

## 2018-07-03 ENCOUNTER — TELEPHONE (OUTPATIENT)
Dept: GASTROENTEROLOGY | Age: 83
End: 2018-07-03

## 2018-07-10 ENCOUNTER — OFFICE VISIT (OUTPATIENT)
Dept: PODIATRY | Age: 83
End: 2018-07-10
Payer: MEDICARE

## 2018-07-10 VITALS
SYSTOLIC BLOOD PRESSURE: 124 MMHG | WEIGHT: 130 LBS | HEIGHT: 66 IN | HEART RATE: 68 BPM | DIASTOLIC BLOOD PRESSURE: 63 MMHG | BODY MASS INDEX: 20.89 KG/M2

## 2018-07-10 DIAGNOSIS — M79.675 PAIN OF TOES OF BOTH FEET: ICD-10-CM

## 2018-07-10 DIAGNOSIS — I73.9 PERIPHERAL VASCULAR DISEASE (HCC): ICD-10-CM

## 2018-07-10 DIAGNOSIS — B35.1 ONYCHOMYCOSIS OF TOENAIL: Primary | ICD-10-CM

## 2018-07-10 DIAGNOSIS — M79.674 PAIN OF TOES OF BOTH FEET: ICD-10-CM

## 2018-07-10 PROCEDURE — 11721 DEBRIDE NAIL 6 OR MORE: CPT | Performed by: PODIATRIST

## 2018-07-10 PROCEDURE — 99999 PR OFFICE/OUTPT VISIT,PROCEDURE ONLY: CPT | Performed by: PODIATRIST

## 2018-07-10 RX ORDER — MIRTAZAPINE 7.5 MG/1
7.5 TABLET, FILM COATED ORAL
COMMUNITY
Start: 2018-05-09

## 2018-07-10 RX ORDER — SERTRALINE HYDROCHLORIDE 25 MG/1
25 TABLET, FILM COATED ORAL
COMMUNITY
Start: 2018-05-01

## 2018-07-11 ASSESSMENT — ENCOUNTER SYMPTOMS
NAUSEA: 0
BACK PAIN: 0
DIARRHEA: 0
COLOR CHANGE: 0
SHORTNESS OF BREATH: 0

## 2018-07-12 NOTE — PROGRESS NOTES
nipper and a . Return in about 9 weeks (around 9/11/2018) for Painful fungal nails.    7/10/2018      Governor ISABEL Tadeo